# Patient Record
Sex: FEMALE | Race: WHITE | NOT HISPANIC OR LATINO
[De-identification: names, ages, dates, MRNs, and addresses within clinical notes are randomized per-mention and may not be internally consistent; named-entity substitution may affect disease eponyms.]

---

## 2019-12-16 PROBLEM — Z00.00 ENCOUNTER FOR PREVENTIVE HEALTH EXAMINATION: Status: ACTIVE | Noted: 2019-12-16

## 2021-02-07 ENCOUNTER — TRANSCRIPTION ENCOUNTER (OUTPATIENT)
Age: 21
End: 2021-02-07

## 2021-10-19 ENCOUNTER — APPOINTMENT (OUTPATIENT)
Dept: GASTROENTEROLOGY | Facility: CLINIC | Age: 21
End: 2021-10-19
Payer: COMMERCIAL

## 2021-10-19 VITALS
OXYGEN SATURATION: 99 % | DIASTOLIC BLOOD PRESSURE: 73 MMHG | BODY MASS INDEX: 33.97 KG/M2 | HEIGHT: 64 IN | SYSTOLIC BLOOD PRESSURE: 110 MMHG | HEART RATE: 83 BPM | WEIGHT: 199 LBS

## 2021-10-19 PROCEDURE — 99204 OFFICE O/P NEW MOD 45 MIN: CPT

## 2021-10-19 NOTE — ASSESSMENT
[FreeTextEntry1] : Impression: The patient is a pleasant 21 year old woman who is referred for an initial Gastroenterology consultation for the evaluation of progressive typical reflux symptoms, including heartburn and regurgitation. She also has had diarrhea, also worsening over the past several weeks. \par \par Plan:\par \par Symptoms are typical of gastroesophageal reflux disease and she has had some response to acid suppressive therapy in the past, which is further suggestive of ongoing gastroesophageal reflux.\par She also has had diarrhea that is chronic and now progressive and a family history suggestive of AI etiology including Celiac. She will therefore have serology today. we will plan of EGD and if serology negative, she will also need a colonoscopy to look for IBD. \par \par Reviewed and reconciled medications, allergies, PMHx, PSHx, SocHx, FMHx. Reviewed imaging, blood work, diagnostic testing, discussed with patient. Further recommendations pending results of above work up and evaluation.\par \par

## 2021-10-19 NOTE — REASON FOR VISIT
[Initial Evaluation] : an initial evaluation [FreeTextEntry1] : dyspepsia, epigastric discomfort, refractory GERD, diarrhea

## 2021-10-19 NOTE — PHYSICAL EXAM
[General Appearance - Alert] : alert [General Appearance - In No Acute Distress] : in no acute distress [Sclera] : the sclera and conjunctiva were normal [PERRL With Normal Accommodation] : pupils were equal in size, round, and reactive to light [Extraocular Movements] : extraocular movements were intact [Outer Ear] : the ears and nose were normal in appearance [Oropharynx] : the oropharynx was normal [Neck Appearance] : the appearance of the neck was normal [Neck Cervical Mass (___cm)] : no neck mass was observed [Jugular Venous Distention Increased] : there was no jugular-venous distention [Thyroid Diffuse Enlargement] : the thyroid was not enlarged [Thyroid Nodule] : there were no palpable thyroid nodules [Auscultation Breath Sounds / Voice Sounds] : lungs were clear to auscultation bilaterally [Heart Rate And Rhythm] : heart rate was normal and rhythm regular [Heart Sounds] : normal S1 and S2 [Heart Sounds Gallop] : no gallops [Murmurs] : no murmurs [Heart Sounds Pericardial Friction Rub] : no pericardial rub [Bowel Sounds] : normal bowel sounds [Abdomen Soft] : soft [Abdomen Tenderness] : non-tender [Abdomen Mass (___ Cm)] : no abdominal mass palpated [Skin Color & Pigmentation] : normal skin color and pigmentation [Skin Turgor] : normal skin turgor [] : no rash

## 2021-10-19 NOTE — HISTORY OF PRESENT ILLNESS
[de-identified] : Monica is a very pleasant 21-year-old female who is referred for an initial Gastroenterology consultation for the evaluation of suspected gastroesophageal reflux disease (GERD).  \par \par The patient was in her usual state of health until several weeks ago when she began having symptoms from post-prandial heartburn and regurgitation. These symptoms tend to be worse with acidic or heavy meals and have recently become progressive and have also been accompanied by frequently post-prandial belching.\par \par In addition, she reports progressive sympotms of fecal urgency, diarrhea and lower abdominal cramping. Initially, this was only happening intermittently, but more recently has been occurring with nearly every meal. \par She subsequently was started on a proton pump inhibitor due to suspected GERD as an etiology of his symptoms.  He reports that he has had mild improvement in his symptoms since that time, but continues to have nearly daily post-prandial regurgitation. Stopping the PPI resulted in significatnly worsening symptoms compared to before, diarrhea unchanged. \par \par Other than heartburn and regurgitation, the patient offers no additional symptoms at today's visit. She specifically denies any odynophagia, dysphagia, hoarseness, changes in voice, heartburn, nausea, vomiting, localized/focal abdominal pain, unintentional weight loss, fevers, chills, skin rash, melena or hematochezia. \par \par

## 2021-10-20 LAB
CRP SERPL-MCNC: <3 MG/L
ERYTHROCYTE [SEDIMENTATION RATE] IN BLOOD BY WESTERGREN METHOD: 3 MM/HR

## 2021-10-21 LAB
ENDOMYSIUM IGA SER QL: NEGATIVE
ENDOMYSIUM IGA TITR SER: NORMAL
IGA SER QL IEP: 129 MG/DL
TTG IGA SER IA-ACNC: <1.2 U/ML
TTG IGA SER-ACNC: NEGATIVE

## 2021-10-25 LAB — IF BLOCK AB SER QL: 1.1 AU/ML

## 2021-11-01 ENCOUNTER — TRANSCRIPTION ENCOUNTER (OUTPATIENT)
Age: 21
End: 2021-11-01

## 2021-11-23 ENCOUNTER — APPOINTMENT (OUTPATIENT)
Dept: GASTROENTEROLOGY | Facility: CLINIC | Age: 21
End: 2021-11-23
Payer: COMMERCIAL

## 2021-11-23 DIAGNOSIS — R12 HEARTBURN: ICD-10-CM

## 2021-11-23 DIAGNOSIS — R19.7 INTESTINAL MALABSORPTION, UNSPECIFIED: ICD-10-CM

## 2021-11-23 DIAGNOSIS — R10.13 EPIGASTRIC PAIN: ICD-10-CM

## 2021-11-23 DIAGNOSIS — K90.9 INTESTINAL MALABSORPTION, UNSPECIFIED: ICD-10-CM

## 2021-11-23 DIAGNOSIS — K21.9 GASTRO-ESOPHAGEAL REFLUX DISEASE W/OUT ESOPHAGITIS: ICD-10-CM

## 2021-11-23 PROCEDURE — 99443: CPT

## 2021-11-23 RX ORDER — FAMOTIDINE 20 MG/1
20 TABLET, FILM COATED ORAL TWICE DAILY
Qty: 60 | Refills: 5 | Status: ACTIVE | COMMUNITY
Start: 2021-11-23 | End: 1900-01-01

## 2021-11-23 RX ORDER — SODIUM SULFATE, MAGNESIUM SULFATE, AND POTASSIUM CHLORIDE 17.75; 2.7; 2.25 G/1; G/1; G/1
1479-225-188 TABLET ORAL
Qty: 1 | Refills: 0 | Status: ACTIVE | COMMUNITY
Start: 2021-11-23 | End: 1900-01-01

## 2021-12-06 ENCOUNTER — APPOINTMENT (OUTPATIENT)
Dept: RHEUMATOLOGY | Facility: CLINIC | Age: 21
End: 2021-12-06

## 2022-01-05 ENCOUNTER — APPOINTMENT (OUTPATIENT)
Dept: GASTROENTEROLOGY | Facility: AMBULATORY MEDICAL SERVICES | Age: 22
End: 2022-01-05
Payer: COMMERCIAL

## 2022-01-05 ENCOUNTER — RESULT REVIEW (OUTPATIENT)
Age: 22
End: 2022-01-05

## 2022-01-05 PROCEDURE — 45380 COLONOSCOPY AND BIOPSY: CPT

## 2022-01-05 PROCEDURE — 43239 EGD BIOPSY SINGLE/MULTIPLE: CPT | Mod: 59

## 2022-01-06 ENCOUNTER — NON-APPOINTMENT (OUTPATIENT)
Age: 22
End: 2022-01-06

## 2022-02-15 ENCOUNTER — APPOINTMENT (OUTPATIENT)
Dept: GASTROENTEROLOGY | Facility: CLINIC | Age: 22
End: 2022-02-15
Payer: COMMERCIAL

## 2022-02-15 DIAGNOSIS — N76.0 ACUTE VAGINITIS: ICD-10-CM

## 2022-02-15 DIAGNOSIS — R14.0 ABDOMINAL DISTENSION (GASEOUS): ICD-10-CM

## 2022-02-15 DIAGNOSIS — B96.89 ACUTE VAGINITIS: ICD-10-CM

## 2022-02-15 PROCEDURE — 99214 OFFICE O/P EST MOD 30 MIN: CPT | Mod: 95

## 2022-02-15 RX ORDER — ESOMEPRAZOLE MAGNESIUM 40 MG/1
40 CAPSULE, DELAYED RELEASE ORAL DAILY
Qty: 90 | Refills: 1 | Status: ACTIVE | COMMUNITY
Start: 2022-02-15 | End: 1900-01-01

## 2022-02-15 NOTE — REASON FOR VISIT
[Follow-Up Visit] : a follow-up visit for [Abdominal Bloating and Discomfort] : abdominal bloating and discomfort [Abdominal Pain] : abdominal pain [GERD] : gastroesophageal reflux disease [FreeTextEntry2] : This visit was completed via telehealth with 2 way video due to the restrictions of the COVID-19 pandemic. All issues as below were discussed and addressed but no physical exam was performed. If it was felt that the patient should be evaluated in clinic then he/she was directed there. The patient verbally consented to visit.

## 2022-02-15 NOTE — CONSULT LETTER
[Dear  ___] : Dear ~ALICJA, [Courtesy Letter:] : I had the pleasure of seeing your patient, [unfilled], in my office today. [Consult Closing:] : Thank you very much for allowing me to participate in the care of this patient.  If you have any questions, please do not hesitate to contact me. [FreeTextEntry1] : Impression: The patient is a very pleasant 21 year old woman with reflux and post prandial abdominal discomfort, bloating, and intermittent loose stool.\par \par Plan:\par \par 1) Gastroenterology: Ms. TAMAYO's symptoms of post prandial vague abdominal discomfort, bloating and intermittent diarrhea are certainly suggestive of underlying small intestinal bacterial overgrowth.  I will therefore schedule him for a lactulose hydrogen breath test (LHBT).  \par \par In addition, she reports more significant symptoms after the ingestion of dairy products and potentially foods with fructose; I will therefore schedule her for lactose and fructose intolerance breath testing in addition.\par \par Instructions will be given to her today to prepare for her breath testing.\par \par We discussed the option of treatment with Xifaxan and will await above results. \par \par \par Lastly, I have recommended re-initiating PPI therapy. She does well with it and will take a course, follow up afterwards. \par \par 2) Disposition: Ms. TAMAYO will return to see me in 6 weeks after the above studies are obtained.

## 2022-03-28 ENCOUNTER — APPOINTMENT (OUTPATIENT)
Dept: GASTROENTEROLOGY | Facility: CLINIC | Age: 22
End: 2022-03-28
Payer: COMMERCIAL

## 2022-03-28 PROCEDURE — 99443: CPT

## 2022-03-28 RX ORDER — PANTOPRAZOLE 20 MG/1
20 TABLET, DELAYED RELEASE ORAL
Qty: 90 | Refills: 2 | Status: ACTIVE | COMMUNITY
Start: 2022-03-28 | End: 1900-01-01

## 2022-03-28 NOTE — HISTORY OF PRESENT ILLNESS
[Home] : at home, [unfilled] , at the time of the visit. [Medical Office: ([facility])___] : at the medical office located in  [Verbal consent obtained from patient] : the patient, [unfilled] [FreeTextEntry1] : Monica reports that she is doing better with the Prlosec. taking 40 mg daily and reports only occasional breakthrough symptoms with dietary indiscretion. No nausea or abdominal pain. She has had improvement is lower GI symptoms as well with the exception of hemorrhoidal flares. occasional burning and discomfort brought on by defecation. diarrhea has been better. denies bleeding. \par i reviewed results of procedures and pathology with her. Advised on tapering PPI therapy. She has been doing better avoiding NSAIDs and can use OTC Pepcid for breakthrough symptoms. also will use topical treatment for hemorrhoids, zinc oxide parrier cream. \par \par Reviewed and reconciled medications, allergies, PMHx, PSHx, SocHx, FMHx. Reviewed imaging, blood work, diagnostic testing, discussed with patient. Further recommendations pending results of above work up and evaluation.\par  [Time Spent: ___ minutes] : I have spent [unfilled] minutes with the patient on the telephone

## 2022-08-16 ENCOUNTER — RX RENEWAL (OUTPATIENT)
Age: 22
End: 2022-08-16

## 2022-08-16 RX ORDER — PANTOPRAZOLE 40 MG/1
40 TABLET, DELAYED RELEASE ORAL
Qty: 90 | Refills: 1 | Status: ACTIVE | COMMUNITY
Start: 2022-02-16 | End: 1900-01-01